# Patient Record
Sex: FEMALE | Race: WHITE | Employment: UNEMPLOYED | URBAN - METROPOLITAN AREA
[De-identification: names, ages, dates, MRNs, and addresses within clinical notes are randomized per-mention and may not be internally consistent; named-entity substitution may affect disease eponyms.]

---

## 2020-07-20 ENCOUNTER — APPOINTMENT (OUTPATIENT)
Dept: CT IMAGING | Age: 50
DRG: 177 | End: 2020-07-20
Attending: EMERGENCY MEDICINE
Payer: OTHER GOVERNMENT

## 2020-07-20 ENCOUNTER — HOSPITAL ENCOUNTER (INPATIENT)
Age: 50
LOS: 3 days | Discharge: HOME | DRG: 177 | End: 2020-07-23
Attending: EMERGENCY MEDICINE | Admitting: INTERNAL MEDICINE
Payer: OTHER GOVERNMENT

## 2020-07-20 ENCOUNTER — APPOINTMENT (OUTPATIENT)
Dept: GENERAL RADIOLOGY | Age: 50
DRG: 177 | End: 2020-07-20
Attending: EMERGENCY MEDICINE
Payer: OTHER GOVERNMENT

## 2020-07-20 DIAGNOSIS — J12.82 PNEUMONIA DUE TO COVID-19 VIRUS: ICD-10-CM

## 2020-07-20 DIAGNOSIS — R09.02 HYPOXIA: ICD-10-CM

## 2020-07-20 DIAGNOSIS — U07.1 PNEUMONIA DUE TO COVID-19 VIRUS: ICD-10-CM

## 2020-07-20 DIAGNOSIS — R06.02 SOB (SHORTNESS OF BREATH): ICD-10-CM

## 2020-07-20 DIAGNOSIS — U07.1 COVID-19: Primary | ICD-10-CM

## 2020-07-20 PROBLEM — E66.01 MORBID OBESITY WITH BODY MASS INDEX OF 40.0-44.9 IN ADULT (HCC): Status: ACTIVE | Noted: 2020-01-15

## 2020-07-20 PROBLEM — R03.0 BLOOD PRESSURE ELEVATED WITHOUT HISTORY OF HTN: Status: ACTIVE | Noted: 2020-01-15

## 2020-07-20 LAB
ALBUMIN SERPL-MCNC: 3.4 G/DL (ref 3.4–5)
ALBUMIN/GLOB SERPL: 0.9 {RATIO} (ref 0.8–1.7)
ALP SERPL-CCNC: 116 U/L (ref 45–117)
ALT SERPL-CCNC: 60 U/L (ref 13–56)
ANION GAP SERPL CALC-SCNC: 9 MMOL/L (ref 3–18)
APPEARANCE UR: CLEAR
ARTERIAL PATENCY WRIST A: ABNORMAL
AST SERPL-CCNC: 36 U/L (ref 10–38)
BACTERIA URNS QL MICRO: ABNORMAL /HPF
BASE EXCESS BLD CALC-SCNC: 1 MMOL/L
BASOPHILS # BLD: 0 K/UL (ref 0–0.1)
BASOPHILS NFR BLD: 0 % (ref 0–2)
BDY SITE: ABNORMAL
BILIRUB SERPL-MCNC: 0.4 MG/DL (ref 0.2–1)
BILIRUB UR QL: NEGATIVE
BUN SERPL-MCNC: 13 MG/DL (ref 7–18)
BUN/CREAT SERPL: 13 (ref 12–20)
CALCIUM SERPL-MCNC: 9.6 MG/DL (ref 8.5–10.1)
CHLORIDE SERPL-SCNC: 107 MMOL/L (ref 100–111)
CO2 SERPL-SCNC: 26 MMOL/L (ref 21–32)
COLOR UR: YELLOW
CREAT SERPL-MCNC: 0.98 MG/DL (ref 0.6–1.3)
DIFFERENTIAL METHOD BLD: ABNORMAL
EOSINOPHIL # BLD: 0.1 K/UL (ref 0–0.4)
EOSINOPHIL NFR BLD: 1 % (ref 0–5)
EPITH CASTS URNS QL MICRO: ABNORMAL /LPF (ref 0–5)
ERYTHROCYTE [DISTWIDTH] IN BLOOD BY AUTOMATED COUNT: 12.4 % (ref 11.6–14.5)
GAS FLOW.O2 O2 DELIVERY SYS: ABNORMAL L/MIN
GLOBULIN SER CALC-MCNC: 3.8 G/DL (ref 2–4)
GLUCOSE SERPL-MCNC: 129 MG/DL (ref 74–99)
GLUCOSE UR STRIP.AUTO-MCNC: NEGATIVE MG/DL
HCG UR QL: NEGATIVE
HCO3 BLD-SCNC: 25.6 MMOL/L (ref 22–26)
HCT VFR BLD AUTO: 48.6 % (ref 35–45)
HGB BLD-MCNC: 15.8 G/DL (ref 12–16)
HGB UR QL STRIP: ABNORMAL
KETONES UR QL STRIP.AUTO: NEGATIVE MG/DL
LEUKOCYTE ESTERASE UR QL STRIP.AUTO: NEGATIVE
LIPASE SERPL-CCNC: 280 U/L (ref 73–393)
LYMPHOCYTES # BLD: 1.8 K/UL (ref 0.9–3.6)
LYMPHOCYTES NFR BLD: 26 % (ref 21–52)
MCH RBC QN AUTO: 30.9 PG (ref 24–34)
MCHC RBC AUTO-ENTMCNC: 32.5 G/DL (ref 31–37)
MCV RBC AUTO: 94.9 FL (ref 74–97)
MONOCYTES # BLD: 0.4 K/UL (ref 0.05–1.2)
MONOCYTES NFR BLD: 6 % (ref 3–10)
MUCOUS THREADS URNS QL MICRO: ABNORMAL /LPF
NEUTS SEG # BLD: 4.5 K/UL (ref 1.8–8)
NEUTS SEG NFR BLD: 67 % (ref 40–73)
NITRITE UR QL STRIP.AUTO: NEGATIVE
O2/TOTAL GAS SETTING VFR VENT: 0.21 %
PCO2 BLD: 41.8 MMHG (ref 35–45)
PH BLD: 7.4 [PH] (ref 7.35–7.45)
PH UR STRIP: 5.5 [PH] (ref 5–8)
PLATELET # BLD AUTO: 416 K/UL (ref 135–420)
PMV BLD AUTO: 9.6 FL (ref 9.2–11.8)
PO2 BLD: 67 MMHG (ref 80–100)
POTASSIUM SERPL-SCNC: 4.3 MMOL/L (ref 3.5–5.5)
PROT SERPL-MCNC: 7.2 G/DL (ref 6.4–8.2)
PROT UR STRIP-MCNC: ABNORMAL MG/DL
RBC # BLD AUTO: 5.12 M/UL (ref 4.2–5.3)
RBC #/AREA URNS HPF: ABNORMAL /HPF (ref 0–5)
SAO2 % BLD: 93 % (ref 92–97)
SERVICE CMNT-IMP: ABNORMAL
SODIUM SERPL-SCNC: 142 MMOL/L (ref 136–145)
SP GR UR REFRACTOMETRY: >1.03 (ref 1–1.03)
SPECIMEN TYPE: ABNORMAL
TOTAL RESP. RATE, ITRR: 18
UROBILINOGEN UR QL STRIP.AUTO: 0.2 EU/DL (ref 0.2–1)
WBC # BLD AUTO: 6.7 K/UL (ref 4.6–13.2)
WBC URNS QL MICRO: ABNORMAL /HPF (ref 0–5)

## 2020-07-20 PROCEDURE — 83690 ASSAY OF LIPASE: CPT

## 2020-07-20 PROCEDURE — 85025 COMPLETE CBC W/AUTO DIFF WBC: CPT

## 2020-07-20 PROCEDURE — 74011250636 HC RX REV CODE- 250/636: Performed by: INTERNAL MEDICINE

## 2020-07-20 PROCEDURE — 87450 LEGIONELLA PNEUMOPHILA AG, URINE: CPT

## 2020-07-20 PROCEDURE — 99285 EMERGENCY DEPT VISIT HI MDM: CPT

## 2020-07-20 PROCEDURE — 74011250637 HC RX REV CODE- 250/637: Performed by: INTERNAL MEDICINE

## 2020-07-20 PROCEDURE — 81001 URINALYSIS AUTO W/SCOPE: CPT

## 2020-07-20 PROCEDURE — 71260 CT THORAX DX C+: CPT

## 2020-07-20 PROCEDURE — 74011250636 HC RX REV CODE- 250/636: Performed by: EMERGENCY MEDICINE

## 2020-07-20 PROCEDURE — 81025 URINE PREGNANCY TEST: CPT

## 2020-07-20 PROCEDURE — 71045 X-RAY EXAM CHEST 1 VIEW: CPT

## 2020-07-20 PROCEDURE — 96374 THER/PROPH/DIAG INJ IV PUSH: CPT

## 2020-07-20 PROCEDURE — 74011636320 HC RX REV CODE- 636/320: Performed by: EMERGENCY MEDICINE

## 2020-07-20 PROCEDURE — 65660000000 HC RM CCU STEPDOWN

## 2020-07-20 PROCEDURE — 36600 WITHDRAWAL OF ARTERIAL BLOOD: CPT

## 2020-07-20 PROCEDURE — 87449 NOS EACH ORGANISM AG IA: CPT

## 2020-07-20 PROCEDURE — 80053 COMPREHEN METABOLIC PANEL: CPT

## 2020-07-20 PROCEDURE — 82803 BLOOD GASES ANY COMBINATION: CPT

## 2020-07-20 RX ORDER — SODIUM CHLORIDE 9 MG/ML
75 INJECTION, SOLUTION INTRAVENOUS CONTINUOUS
Status: DISPENSED | OUTPATIENT
Start: 2020-07-20 | End: 2020-07-21

## 2020-07-20 RX ORDER — MELATONIN
2000 DAILY
Status: DISCONTINUED | OUTPATIENT
Start: 2020-07-21 | End: 2020-07-23 | Stop reason: HOSPADM

## 2020-07-20 RX ORDER — ALBUTEROL SULFATE 90 UG/1
2 AEROSOL, METERED RESPIRATORY (INHALATION)
Status: DISCONTINUED | OUTPATIENT
Start: 2020-07-21 | End: 2020-07-23 | Stop reason: HOSPADM

## 2020-07-20 RX ORDER — ALBUTEROL SULFATE 0.83 MG/ML
2.5 SOLUTION RESPIRATORY (INHALATION)
Status: DISCONTINUED | OUTPATIENT
Start: 2020-07-21 | End: 2020-07-20 | Stop reason: CLARIF

## 2020-07-20 RX ORDER — ONDANSETRON 2 MG/ML
4 INJECTION INTRAMUSCULAR; INTRAVENOUS ONCE
Status: COMPLETED | OUTPATIENT
Start: 2020-07-20 | End: 2020-07-20

## 2020-07-20 RX ORDER — LEVOFLOXACIN 5 MG/ML
750 INJECTION, SOLUTION INTRAVENOUS EVERY 24 HOURS
Status: DISCONTINUED | OUTPATIENT
Start: 2020-07-20 | End: 2020-07-21

## 2020-07-20 RX ORDER — ZINC SULFATE 50(220)MG
1 CAPSULE ORAL DAILY
Status: DISCONTINUED | OUTPATIENT
Start: 2020-07-21 | End: 2020-07-23 | Stop reason: HOSPADM

## 2020-07-20 RX ORDER — ASCORBIC ACID 250 MG
250 TABLET ORAL DAILY
Status: DISCONTINUED | OUTPATIENT
Start: 2020-07-21 | End: 2020-07-21

## 2020-07-20 RX ORDER — ALBUTEROL SULFATE 90 UG/1
2 AEROSOL, METERED RESPIRATORY (INHALATION)
Status: DISCONTINUED | OUTPATIENT
Start: 2020-07-21 | End: 2020-07-20 | Stop reason: CLARIF

## 2020-07-20 RX ORDER — DEXAMETHASONE 4 MG/1
6 TABLET ORAL DAILY
Status: DISCONTINUED | OUTPATIENT
Start: 2020-07-20 | End: 2020-07-22

## 2020-07-20 RX ORDER — CHOLECALCIFEROL (VITAMIN D3) 125 MCG
5 CAPSULE ORAL
Status: DISCONTINUED | OUTPATIENT
Start: 2020-07-20 | End: 2020-07-23 | Stop reason: HOSPADM

## 2020-07-20 RX ORDER — ACETAMINOPHEN 650 MG/1
650 SUPPOSITORY RECTAL
Status: DISCONTINUED | OUTPATIENT
Start: 2020-07-20 | End: 2020-07-23 | Stop reason: HOSPADM

## 2020-07-20 RX ORDER — HYDROCODONE POLISTIREX AND CHLORPHENIRAMINE POLISTIREX 10; 8 MG/5ML; MG/5ML
5 SUSPENSION, EXTENDED RELEASE ORAL
Status: DISCONTINUED | OUTPATIENT
Start: 2020-07-20 | End: 2020-07-23 | Stop reason: HOSPADM

## 2020-07-20 RX ORDER — ENOXAPARIN SODIUM 100 MG/ML
30 INJECTION SUBCUTANEOUS EVERY 12 HOURS
Status: DISCONTINUED | OUTPATIENT
Start: 2020-07-20 | End: 2020-07-21

## 2020-07-20 RX ORDER — ACETAMINOPHEN 325 MG/1
650 TABLET ORAL
Status: DISCONTINUED | OUTPATIENT
Start: 2020-07-20 | End: 2020-07-23 | Stop reason: HOSPADM

## 2020-07-20 RX ADMIN — LEVOFLOXACIN 750 MG: 5 INJECTION, SOLUTION INTRAVENOUS at 22:52

## 2020-07-20 RX ADMIN — ENOXAPARIN SODIUM 30 MG: 30 INJECTION SUBCUTANEOUS at 20:56

## 2020-07-20 RX ADMIN — SODIUM CHLORIDE 75 ML/HR: 900 INJECTION, SOLUTION INTRAVENOUS at 22:48

## 2020-07-20 RX ADMIN — DEXAMETHASONE 6 MG: 4 TABLET ORAL at 22:53

## 2020-07-20 RX ADMIN — ONDANSETRON 4 MG: 2 INJECTION INTRAMUSCULAR; INTRAVENOUS at 14:07

## 2020-07-20 RX ADMIN — IOPAMIDOL 100 ML: 612 INJECTION, SOLUTION INTRAVENOUS at 17:38

## 2020-07-20 RX ADMIN — Medication 5 MG: at 22:49

## 2020-07-20 RX ADMIN — SODIUM CHLORIDE 1000 ML: 900 INJECTION, SOLUTION INTRAVENOUS at 13:46

## 2020-07-20 NOTE — ED TRIAGE NOTES
Pt w/ c/o worsening of SOB and cough x4 days after being diagnosed with COVID on Tuesday. Pt reports a majority of her household has tested positive for COVID. Pt reports having cough for 2 weeks and intermittent cp w/ cough and inspiration. Pt reports intermittent fevers last week and no relief w/ rescue inhaler.

## 2020-07-20 NOTE — H&P
History & Physical    Patient: Elizabet Monsivais MRN: [id number]  CSN: [id number]    YOB: 1970  Age: 52 y.o. Sex: female      DOA: 7/20/2020    Chief Complaint:   Chief Complaint   Patient presents with    Shortness of Breath          HPI:     Elizabet Monsivais is a 52 y.o.  female who has history of hypertension presents and obesity presents to the emergency room with worsening hypoxemia and dyspnea despite using her inhaler she was seen at urgent care on the 14th and tested positive for COVID she was at a family gathering for 4 July several family members were ill after including her  and child she unfortunately has progressed with cough congestion body aches and dyspnea in the emergency room she was found to have sats of 90 to 92% on room air and mostly would desat with exertion and ABGs not done a CT scan in the emergency room shows bilateral atypical viral pneumonia consistent with COVID    History reviewed. No pertinent past medical history. History reviewed. No pertinent surgical history. History reviewed. No pertinent family history. Social History     Socioeconomic History    Marital status:      Spouse name: Not on file    Number of children: Not on file    Years of education: Not on file    Highest education level: Not on file   Tobacco Use    Smoking status: Never Smoker    Smokeless tobacco: Never Used   Substance and Sexual Activity    Alcohol use: Not Currently    Drug use: Never       Prior to Admission medications    Not on File       Allergies   Allergen Reactions    Penicillins Anaphylaxis         Review of Systems  GENERAL: Patient alert, awake and oriented times 3, able to communicate full sentences and not in distress. HEENT: No change in vision, no earache, tinnitus, sore throat or sinus congestion. NECK: No pain or stiffness. PULMONARY: +shortness of breath, +cough +wheeze.    Cardiovascular: no pnd or orthopnea, no CP  GASTROINTESTINAL: No abdominal pain, nausea, vomiting or diarrhea, melena or bright red blood per rectum. GENITOURINARY: No urinary frequency, urgency, hesitancy or dysuria. MUSCULOSKELETAL: + joint or muscle pain, no back pain, no recent trauma. DERMATOLOGIC: No rash, no itching, no lesions. ENDOCRINE: No polyuria, polydipsia, no heat or cold intolerance. No recent change in weight. HEMATOLOGICAL: No anemia or easy bruising or bleeding. NEUROLOGIC: + headache, seizures, numbness, tingling +weakness. Physical Exam:     Physical Exam:  Visit Vitals  /83   Pulse 84   Temp 98.2 °F (36.8 °C)   Resp 18   Ht 5' 8\" (1.727 m)   Wt 127 kg (280 lb)   SpO2 95%   BMI 42.57 kg/m²      O2 Device: Room air    Temp (24hrs), Av.6 °F (36.4 °C), Min:97 °F (36.1 °C), Max:98.2 °F (36.8 °C)    No intake/output data recorded.  0701 -  1900  In: 1000 [I.V.:1000]  Out: -     General:  Alert, cooperative, no distress, appears stated age. Head: Normocephalic, without obvious abnormality, atraumatic. Eyes:  Conjunctivae/corneas clear. PERRL, EOMs intact. Tongue piercing   Nose: Nares normal. No drainage or sinus tenderness. Neck: Supple, symmetrical, trachea midline, no adenopathy, thyroid: no enlargement, no carotid bruit and no JVD. Lungs:   Clear to auscultation bilaterally. Diffuse expiratory wheeze   Heart:  Regular rate and rhythm, S1, S2 normal.     Abdomen: Soft, non-tender. Bowel sounds normal.    Extremities: Extremities normal, atraumatic, no cyanosis or edema. Pulses: 2+ and symmetric all extremities. Skin:  No rashes or lesions   Neurologic: AAOx3, No focal motor or sensory deficit.        Labs Reviewed:  Recent Results (from the past 24 hour(s))   CBC WITH AUTOMATED DIFF    Collection Time: 20  1:40 PM   Result Value Ref Range    WBC 6.7 4.6 - 13.2 K/uL    RBC 5.12 4.20 - 5.30 M/uL    HGB 15.8 12.0 - 16.0 g/dL    HCT 48.6 (H) 35.0 - 45.0 %    MCV 94.9 74.0 - 97.0 FL    MCH 30.9 24.0 - 34.0 PG    MCHC 32.5 31.0 - 37.0 g/dL    RDW 12.4 11.6 - 14.5 %    PLATELET 956 307 - 589 K/uL    MPV 9.6 9.2 - 11.8 FL    NEUTROPHILS 67 40 - 73 %    LYMPHOCYTES 26 21 - 52 %    MONOCYTES 6 3 - 10 %    EOSINOPHILS 1 0 - 5 %    BASOPHILS 0 0 - 2 %    ABS. NEUTROPHILS 4.5 1.8 - 8.0 K/UL    ABS. LYMPHOCYTES 1.8 0.9 - 3.6 K/UL    ABS. MONOCYTES 0.4 0.05 - 1.2 K/UL    ABS. EOSINOPHILS 0.1 0.0 - 0.4 K/UL    ABS. BASOPHILS 0.0 0.0 - 0.1 K/UL    DF AUTOMATED     METABOLIC PANEL, COMPREHENSIVE    Collection Time: 07/20/20  1:40 PM   Result Value Ref Range    Sodium 142 136 - 145 mmol/L    Potassium 4.3 3.5 - 5.5 mmol/L    Chloride 107 100 - 111 mmol/L    CO2 26 21 - 32 mmol/L    Anion gap 9 3.0 - 18 mmol/L    Glucose 129 (H) 74 - 99 mg/dL    BUN 13 7.0 - 18 MG/DL    Creatinine 0.98 0.6 - 1.3 MG/DL    BUN/Creatinine ratio 13 12 - 20      GFR est AA >60 >60 ml/min/1.73m2    GFR est non-AA >60 >60 ml/min/1.73m2    Calcium 9.6 8.5 - 10.1 MG/DL    Bilirubin, total 0.4 0.2 - 1.0 MG/DL    ALT (SGPT) 60 (H) 13 - 56 U/L    AST (SGOT) 36 10 - 38 U/L    Alk.  phosphatase 116 45 - 117 U/L    Protein, total 7.2 6.4 - 8.2 g/dL    Albumin 3.4 3.4 - 5.0 g/dL    Globulin 3.8 2.0 - 4.0 g/dL    A-G Ratio 0.9 0.8 - 1.7     LIPASE    Collection Time: 07/20/20  1:40 PM   Result Value Ref Range    Lipase 280 73 - 393 U/L   URINALYSIS W/ RFLX MICROSCOPIC    Collection Time: 07/20/20  2:20 PM   Result Value Ref Range    Color YELLOW      Appearance CLEAR      Specific gravity >1.030 (H) 1.005 - 1.030    pH (UA) 5.5 5.0 - 8.0      Protein TRACE (A) NEG mg/dL    Glucose Negative NEG mg/dL    Ketone Negative NEG mg/dL    Bilirubin Negative NEG      Blood SMALL (A) NEG      Urobilinogen 0.2 0.2 - 1.0 EU/dL    Nitrites Negative NEG      Leukocyte Esterase Negative NEG     HCG URINE, QL    Collection Time: 07/20/20  2:20 PM   Result Value Ref Range    HCG urine, QL Negative NEG     URINE MICROSCOPIC ONLY    Collection Time: 07/20/20  2:20 PM   Result Value Ref Range    WBC 2 to 5 0 - 5 /hpf    RBC 2 to 5 0 - 5 /hpf    Epithelial cells 3+ 0 - 5 /lpf    Bacteria 3+ (A) NEG /hpf    Mucus 4+ (A) NEG /lpf     All lab results for the last 24 hours reviewed. and EKG    Procedures/imaging: see electronic medical records for all procedures/Xrays and details which were not copied into this note but were reviewed prior to creation of Plan      Assessment/Plan     Active Problems:    * No active hospital problems. *  1.  COVID pneumonia we will treat with supportive care she will be placed on low-dose Decadron due to her mild hypoxemia I will cover for bacterial infection with Levaquin due to severe allergy of penicillin we will also check urine streptococcal antigen and Legionella inflammatory markers are obtained she is placed on prophylactic zinc melatonin vitamin D and vitamin C  We will get ID consult do not believe she is sick enough right now for Remdesivir or plasma that is convalescent  She is placed on therapeutic Lovenox duplex ultrasound will be obtained  2. Hypoxemia supplemental oxygen as needed we will obtain a baseline ABG  DVT/GI Prophylaxis: Lovenox  Echocardiogram obtain cardiac enzymes obtained  Proventil. Discussed with patient at bedside about hospital admission and my plan care, who understood and agree with my plan care.     Christina White MD  7/20/2020 7:21 PM

## 2020-07-20 NOTE — ED PROVIDER NOTES
EMERGENCY DEPARTMENT HISTORY AND PHYSICAL EXAM    Date: 7/20/2020  Patient Name: Bry Rubio    History of Presenting Illness     Chief Complaint   Patient presents with    Shortness of Breath       History Provided By: Patient     History Odalys Han):   1:21 PM  Bry Rubio is a 52 y.o. female with no significant PMHX who presents to the emergency department [address] onset Fourth of July weekend. Associated sxs include nausea and abdominal pain. Pt denies or any other sxs or complaints. Patient was informed on Saturday that she is positive for COVID. She was exposed over 4 July weekend with the rest of her family. She has cough that is disturbing her with mild shortness of breath. She also has some nausea and abdominal pain. She was placed on a antibiotic by her primary care doctor. Chief Complaint: COVID  Duration: 2 weeks  Timing: Acute  Location: Generalized  Quality: Tightness  Severity: Moderate  Modifying Factors: Nothing makes it better, the ED or worse.   Associated Symptoms: Nausea and abdominal pain    PCP: Reynaldo Morgan NP     Current Facility-Administered Medications   Medication Dose Route Frequency Provider Last Rate Last Dose    enoxaparin (LOVENOX) injection 30 mg  30 mg SubCUTAneous Q12H Dustin Byrne MD   30 mg at 07/20/20 2056    acetaminophen (TYLENOL) tablet 650 mg  650 mg Oral Q6H PRN Dustin Byrne MD        Or    acetaminophen (TYLENOL) suppository 650 mg  650 mg Rectal Q6H PRN Dustin Byrne MD        dexAMETHasone (DECADRON) tablet 6 mg  6 mg Oral DAILY Dustin Byrne MD   6 mg at 07/20/20 2253    zinc sulfate (ZINCATE) 220 (50) mg capsule 1 Cap  1 Cap Oral DAILY Dustin Byrne MD        melatonin tablet 5 mg  5 mg Oral QHS Dustin Bynre MD   5 mg at 07/20/20 2249    cholecalciferol (VITAMIN D3) (1000 Units /25 mcg) tablet 2 Tab  2,000 Units Oral DAILY Dustin Byrne MD        ascorbic acid (vitamin C) (VITAMIN C) tablet 250 mg  250 mg Oral DAILY Soo Byrne MD        levoFLOXacin (LEVAQUIN) 750 mg in D5W IVPB  750 mg IntraVENous Q24H Soo Byrne  mL/hr at 07/20/20 2252 750 mg at 07/20/20 2252    chlorpheniramine-HYDROcodone (TUSSIONEX) oral suspension 5 mL  5 mL Oral Q12H PRN Soo Byrne MD        0.9% sodium chloride infusion  75 mL/hr IntraVENous CONTINUOUS Soo Byrne MD 75 mL/hr at 07/20/20 2248 75 mL/hr at 07/20/20 2248    albuterol (PROVENTIL HFA, VENTOLIN HFA, PROAIR HFA) inhaler (Keep In Patient Room)  2 Puff Inhalation Q4H RT Soo Byrne MD           Past History     Past Medical History:  Past Medical History:   Diagnosis Date    HTN (hypertension)     Hyperglycemia     Obesity        Past Surgical History:  History reviewed. No pertinent surgical history. Family History:  Family History   Problem Relation Age of Onset    Heart Disease Mother        Social History:  Social History     Tobacco Use    Smoking status: Never Smoker    Smokeless tobacco: Never Used   Substance Use Topics    Alcohol use: Not Currently    Drug use: Never       Allergies: Allergies   Allergen Reactions    Penicillins Anaphylaxis         Review of Systems   Review of Systems   Constitutional: Negative for chills and fever. Respiratory: Positive for cough and shortness of breath. Cardiovascular: Negative for chest pain. Gastrointestinal: Positive for abdominal pain and nausea. Negative for vomiting. All other systems reviewed and are negative. Physical Exam     Vitals:    07/20/20 1945 07/20/20 2030 07/20/20 2100 07/20/20 2230   BP: 134/73 125/82 129/83 126/80   Pulse:   84 81   Resp:   18 18   Temp:   98.2 °F (36.8 °C) 98.7 °F (37.1 °C)   SpO2: 95% 93% 95% 97%   Weight:       Height:         Physical Exam  Vitals signs and nursing note reviewed.        Vital signs and nursing notes reviewed  CONSTITUTIONAL: Alert, in no apparent distress; well-developed; well-nourished. HEAD:  Normocephalic, atraumatic  EYES: PERRL; EOM's intact. ENTM: Nose: no rhinorrhea; Throat: no erythema or exudate, mucous membranes moist  Neck:  No JVD, supple without lymphadenopathy  RESP: Coarse breath sounds bilaterally, good air movement. CV: S1 and S2 WNL; No murmurs, gallops or rubs. GI: Normal bowel sounds, abdomen soft and non-tender with no rebound or guarding. No masses or organomegaly. : No costo-vertebral angle tenderness. BACK:  Non-tender  UPPER EXT:  Normal inspection  LOWER EXT: No edema, no calf tenderness. Distal pulses intact. NEURO: CN intact, reflexes 2/4 and sym, strength 5/5 and sym, sensation intact. SKIN: No rashes; Normal for age and stage. PSYCH:  Alert and oriented, normal affect. Diagnostic Study Results     Labs -     Recent Results (from the past 12 hour(s))   CBC WITH AUTOMATED DIFF    Collection Time: 07/20/20  1:40 PM   Result Value Ref Range    WBC 6.7 4.6 - 13.2 K/uL    RBC 5.12 4.20 - 5.30 M/uL    HGB 15.8 12.0 - 16.0 g/dL    HCT 48.6 (H) 35.0 - 45.0 %    MCV 94.9 74.0 - 97.0 FL    MCH 30.9 24.0 - 34.0 PG    MCHC 32.5 31.0 - 37.0 g/dL    RDW 12.4 11.6 - 14.5 %    PLATELET 533 494 - 696 K/uL    MPV 9.6 9.2 - 11.8 FL    NEUTROPHILS 67 40 - 73 %    LYMPHOCYTES 26 21 - 52 %    MONOCYTES 6 3 - 10 %    EOSINOPHILS 1 0 - 5 %    BASOPHILS 0 0 - 2 %    ABS. NEUTROPHILS 4.5 1.8 - 8.0 K/UL    ABS. LYMPHOCYTES 1.8 0.9 - 3.6 K/UL    ABS. MONOCYTES 0.4 0.05 - 1.2 K/UL    ABS. EOSINOPHILS 0.1 0.0 - 0.4 K/UL    ABS.  BASOPHILS 0.0 0.0 - 0.1 K/UL    DF AUTOMATED     METABOLIC PANEL, COMPREHENSIVE    Collection Time: 07/20/20  1:40 PM   Result Value Ref Range    Sodium 142 136 - 145 mmol/L    Potassium 4.3 3.5 - 5.5 mmol/L    Chloride 107 100 - 111 mmol/L    CO2 26 21 - 32 mmol/L    Anion gap 9 3.0 - 18 mmol/L    Glucose 129 (H) 74 - 99 mg/dL    BUN 13 7.0 - 18 MG/DL    Creatinine 0.98 0.6 - 1.3 MG/DL    BUN/Creatinine ratio 13 12 - 20      GFR est AA >60 >60 ml/min/1.73m2    GFR est non-AA >60 >60 ml/min/1.73m2    Calcium 9.6 8.5 - 10.1 MG/DL    Bilirubin, total 0.4 0.2 - 1.0 MG/DL    ALT (SGPT) 60 (H) 13 - 56 U/L    AST (SGOT) 36 10 - 38 U/L    Alk. phosphatase 116 45 - 117 U/L    Protein, total 7.2 6.4 - 8.2 g/dL    Albumin 3.4 3.4 - 5.0 g/dL    Globulin 3.8 2.0 - 4.0 g/dL    A-G Ratio 0.9 0.8 - 1.7     LIPASE    Collection Time: 07/20/20  1:40 PM   Result Value Ref Range    Lipase 280 73 - 393 U/L   URINALYSIS W/ RFLX MICROSCOPIC    Collection Time: 07/20/20  2:20 PM   Result Value Ref Range    Color YELLOW      Appearance CLEAR      Specific gravity >1.030 (H) 1.005 - 1.030    pH (UA) 5.5 5.0 - 8.0      Protein TRACE (A) NEG mg/dL    Glucose Negative NEG mg/dL    Ketone Negative NEG mg/dL    Bilirubin Negative NEG      Blood SMALL (A) NEG      Urobilinogen 0.2 0.2 - 1.0 EU/dL    Nitrites Negative NEG      Leukocyte Esterase Negative NEG     HCG URINE, QL    Collection Time: 07/20/20  2:20 PM   Result Value Ref Range    HCG urine, QL Negative NEG     URINE MICROSCOPIC ONLY    Collection Time: 07/20/20  2:20 PM   Result Value Ref Range    WBC 2 to 5 0 - 5 /hpf    RBC 2 to 5 0 - 5 /hpf    Epithelial cells 3+ 0 - 5 /lpf    Bacteria 3+ (A) NEG /hpf    Mucus 4+ (A) NEG /lpf   POC G3    Collection Time: 07/20/20 11:31 PM   Result Value Ref Range    Device: ROOM AIR      FIO2 (POC) 0.21 %    pH (POC) 7.40 7.35 - 7.45      pCO2 (POC) 41.8 35.0 - 45.0 MMHG    pO2 (POC) 67 (L) 80 - 100 MMHG    HCO3 (POC) 25.6 22 - 26 MMOL/L    sO2 (POC) 93 92 - 97 %    Base excess (POC) 1 mmol/L    Allens test (POC) N/A      Total resp. rate 18      Site LEFT RADIAL      Specimen type (POC) ARTERIAL      Performed by Leonarda Ya        Radiologic Studies -   CT CHEST W CONT   Final Result   IMPRESSION:      There are peripheral groundglass opacities and airspace disease in both upper   lobes and left lower lobe as well as right middle lobe.  Findings are suggestive   of pneumonia and are typical in appearance for Covid 19 pneumonia. Hepatic steatosis      Splenomegaly      XR CHEST PORT   Final Result   IMPRESSION:      Bilateral lower lobe infiltrates, atelectasis/effusion. Subtle nodularity in the   right mid to upper lung zone. CT Results  (Last 48 hours)               07/20/20 1744  CT CHEST W CONT Final result    Impression:  IMPRESSION:       There are peripheral groundglass opacities and airspace disease in both upper   lobes and left lower lobe as well as right middle lobe. Findings are suggestive   of pneumonia and are typical in appearance for Covid 19 pneumonia. Hepatic steatosis       Splenomegaly       Narrative:  EXAM: CT chest        INDICATION: Cough, COVID       COMPARISON: Chest x-ray dated July 20, 2020       TECHNIQUE: Axial CT imaging from the thoracic inlet through the diaphragm with   intravenous contrast. Multiplanar reformats were generated. One or more dose   reduction techniques were used on this CT: automated exposure control,   adjustment of the mAs and/or kVp according to patient size, and iterative   reconstruction techniques. The specific techniques used on this CT exam have   been documented in the patient's electronic medical record. Digital Imaging and   Communications in Medicine (DICOM) format image data are available to   nonaffiliated external healthcare facilities or entities on a secure, media   free, reciprocally searchable basis with patient authorization for at least a   12-month period after this study. _______________       FINDINGS:       THYROID: Unremarkable       LYMPH NODES: No enlarged lymph nodes seen. PLEURA: There are no pleural effusion. HEART: Normal in size without pericardial effusion. VASCULATURE/MEDIASTINUM: Unremarkable       LUNGS: No suspicious nodule or mass. There are patchy airspace opacities in both   upper lobes and left lower lobe with associated groundglass densities. These   findings are typical for COVID 19 pneumonia       AIRWAY: Patent       UPPER ABDOMEN: There is hepatic steatosis with areas of focal fatty sparing. Spleen is enlarged at 13 cm. Gallbladder is contracted. Visualized solid organs   otherwise are unremarkable otherwise. OTHER: No acute or aggressive osseous abnormalities identified. _______________               CXR Results  (Last 48 hours)               07/20/20 1424  XR CHEST PORT Final result    Impression:  IMPRESSION:       Bilateral lower lobe infiltrates, atelectasis/effusion. Subtle nodularity in the   right mid to upper lung zone. Narrative:  EXAM: XR CHEST PORT       CLINICAL INDICATION/HISTORY: cough, COVID positive   -Additional: None       COMPARISON: None       TECHNIQUE: Portable frontal view of the chest       _______________       FINDINGS:       SUPPORT DEVICES: None. HEART AND MEDIASTINUM: Cardiomediastinal silhouette within normal limits. LUNGS AND PLEURAL SPACES: Bilateral lower lobe infiltrates,   atelectasis/effusion. Subtle nodularity in the right mid to upper lung zone.  No   large effusion or pneumothorax.       _______________                 Medications given in the ED-  Medications   enoxaparin (LOVENOX) injection 30 mg (30 mg SubCUTAneous Given 7/20/20 2056)   acetaminophen (TYLENOL) tablet 650 mg (has no administration in time range)     Or   acetaminophen (TYLENOL) suppository 650 mg (has no administration in time range)   dexAMETHasone (DECADRON) tablet 6 mg (6 mg Oral Given 7/20/20 2253)   zinc sulfate (ZINCATE) 220 (50) mg capsule 1 Cap (has no administration in time range)   melatonin tablet 5 mg (5 mg Oral Given 7/20/20 2249)   cholecalciferol (VITAMIN D3) (1000 Units /25 mcg) tablet 2 Tab (has no administration in time range)   ascorbic acid (vitamin C) (VITAMIN C) tablet 250 mg (has no administration in time range)   levoFLOXacin (LEVAQUIN) 750 mg in D5W IVPB (750 mg IntraVENous New Bag 7/20/20 4062)   chlorpheniramine-HYDROcodone (TUSSIONEX) oral suspension 5 mL (has no administration in time range)   0.9% sodium chloride infusion (75 mL/hr IntraVENous New Bag 7/20/20 2248)   albuterol (PROVENTIL HFA, VENTOLIN HFA, PROAIR HFA) inhaler (Keep In Patient Room) (has no administration in time range)   sodium chloride 0.9 % bolus infusion 1,000 mL (0 mL IntraVENous IV Completed 7/20/20 1446)   ondansetron (ZOFRAN) injection 4 mg (4 mg IntraVENous Given 7/20/20 1407)   iopamidoL (ISOVUE 300) 61 % contrast injection 100 mL (100 mL IntraVENous Given 7/20/20 1738)         Medical Decision Making   I am the first provider for this patient. I reviewed the vital signs, available nursing notes, past medical history, past surgical history, family history and social history. Vital Signs-Reviewed the patient's vital signs. Pulse Oximetry Analysis - 97% on RA     Cardiac Monitor:  Rate: 81 bpm  Rhythm: NSR    Records Reviewed: Nursing Notes    Provider Notes (Medical Decision Making):   DDX: COVID    Procedures:  Procedures    ED Course:   1:21 PM Initial assessment performed. The patients presenting problems have been discussed, and they are in agreement with the care plan formulated and outlined with them. I have encouraged them to ask questions as they arise throughout their visit. 7:28 PM Dr. Emily Shannon for tele admission    Diagnosis and Disposition     Discussion:  52 y.o. female with cold exposure, escalated test bilateral pneumonia patient has desaturations to the low 90s in bed. Discussed with hospitalist requested a CT scan prior to admission. Discussed with evening hospitalist the patient has bilateral, pneumonia. They will admit patient to the hospital.    Critical Care Time:   I have spent 45 minutes of critical care time involved in lab review, consultations with specialist, family decision-making, and documentation.   During this entire length of time I was immediately available to the patient. Critical Care: The reason for providing this level of medical care for this critically ill patient was due a critical illness that impaired one or more vital organ systems such that there was a high probability of imminent or life threatening deterioration in the patients condition. This care involved high complexity decision making to assess, manipulate, and support vital system functions, to treat this degreee vital organ system failure and to prevent further life threatening deterioration of the patients condition. Critical Care Time: 45 minutes    Core Measures:  For Hospitalized Patients:    1. Hospitalization Decision Time:  The decision to hospitalize the patient was made by Rachele Duggan MD, MD at 3:00 PM on 7/20/2020    2. Aspirin: Aspirin was not given because the patient did not present with a stroke at the time of their Emergency Department evaluation    7:28 PM. Patient is being admitted to the hospital by Dr. Amber Santaigo. The results of their tests and reasons for their admission have been discussed with them and/or available family. They convey agreement and understanding for the need to be admitted and for their admission diagnosis. CONDITIONS ON ADMISSION:  Sepsis is not present at the time of admission. Deep Vein Thrombosis is not present at the time of admission. Thrombosis is not present at the time of admission. Urinary Tract Infection is not present at the time of admission. Pneumonia is not present at the time of admission. MRSA is not present at the time of admission. Wound infection is not present at the time of admission. Pressure Ulcer is not present at the time of admission. CLINICAL IMPRESSION:    1. COVID-19    2. Pneumonia due to COVID-19 virus    3. Hypoxia        Dragon Disclaimer     Please note that this dictation was completed with [employer], the [employer] voice recognition software.   Quite often unanticipated grammatical, syntax, homophones, and other interpretive errors are inadvertently transcribed by the computer software. Please disregard these errors. Please excuse any errors that have escaped final proofreading.     Uri Adamson MD

## 2020-07-21 ENCOUNTER — APPOINTMENT (OUTPATIENT)
Dept: VASCULAR SURGERY | Age: 50
DRG: 177 | End: 2020-07-21
Attending: INTERNAL MEDICINE
Payer: OTHER GOVERNMENT

## 2020-07-21 ENCOUNTER — APPOINTMENT (OUTPATIENT)
Dept: NON INVASIVE DIAGNOSTICS | Age: 50
DRG: 177 | End: 2020-07-21
Attending: INTERNAL MEDICINE
Payer: OTHER GOVERNMENT

## 2020-07-21 LAB
ABO + RH BLD: NORMAL
ALBUMIN SERPL-MCNC: 3.3 G/DL (ref 3.4–5)
ALBUMIN/GLOB SERPL: 0.9 {RATIO} (ref 0.8–1.7)
ALP SERPL-CCNC: 101 U/L (ref 45–117)
ALT SERPL-CCNC: 65 U/L (ref 13–56)
ANION GAP SERPL CALC-SCNC: 7 MMOL/L (ref 3–18)
APTT PPP: 28.7 SEC (ref 23–36.4)
AST SERPL-CCNC: 35 U/L (ref 10–38)
AV VELOCITY RATIO: 0.64
AV VTI RATIO: 0.7
BASOPHILS # BLD: 0 K/UL (ref 0–0.1)
BASOPHILS NFR BLD: 0 % (ref 0–2)
BILIRUB SERPL-MCNC: 0.4 MG/DL (ref 0.2–1)
BUN SERPL-MCNC: 11 MG/DL (ref 7–18)
BUN/CREAT SERPL: 13 (ref 12–20)
CALCIUM SERPL-MCNC: 9.1 MG/DL (ref 8.5–10.1)
CHLORIDE SERPL-SCNC: 107 MMOL/L (ref 100–111)
CO2 SERPL-SCNC: 25 MMOL/L (ref 21–32)
CREAT SERPL-MCNC: 0.87 MG/DL (ref 0.6–1.3)
D DIMER PPP FEU-MCNC: 1.34 UG/ML(FEU)
DIFFERENTIAL METHOD BLD: ABNORMAL
ECHO AO ASC DIAM: 3.67 CM
ECHO AO ROOT DIAM: 3.38 CM
ECHO AV AREA PEAK VELOCITY: 2 CM2
ECHO AV AREA VTI: 2.4 CM2
ECHO AV AREA/BSA PEAK VELOCITY: 0.8 CM2/M2
ECHO AV AREA/BSA VTI: 1 CM2/M2
ECHO AV MEAN GRADIENT: 3.7 MMHG
ECHO AV MEAN VELOCITY: 0.89 M/S
ECHO AV PEAK GRADIENT: 7.6 MMHG
ECHO AV PEAK VELOCITY: 137.71 CM/S
ECHO AV VTI: 26.74 CM
ECHO IVC PROX: 2.31 CM
ECHO IVC SNIFF: 2.31 CM
ECHO LA MAJOR AXIS: 4.02 CM
ECHO LA MINOR AXIS: 1.7 CM
ECHO LV E' LATERAL VELOCITY: 8 CM/S
ECHO LV E' SEPTAL VELOCITY: 8 CM/S
ECHO LV EDV A2C: 89 ML
ECHO LV EDV A4C: 76.6 ML
ECHO LV EDV BP: 85.4 ML (ref 56–104)
ECHO LV EDV INDEX A4C: 32.5 ML/M2
ECHO LV EDV INDEX BP: 36.2 ML/M2
ECHO LV EDV NDEX A2C: 37.7 ML/M2
ECHO LV EDV TEICHHOLZ: 0.57 ML
ECHO LV EJECTION FRACTION A2C: 54 %
ECHO LV EJECTION FRACTION A4C: 74 %
ECHO LV EJECTION FRACTION BIPLANE: 62.6 % (ref 55–100)
ECHO LV ESV A2C: 40.6 ML
ECHO LV ESV A4C: 20.3 ML
ECHO LV ESV BP: 32 ML (ref 19–49)
ECHO LV ESV INDEX A2C: 17.2 ML/M2
ECHO LV ESV INDEX A4C: 8.6 ML/M2
ECHO LV ESV INDEX BP: 13.6 ML/M2
ECHO LV ESV TEICHHOLZ: 0.28 ML
ECHO LV INTERNAL DIMENSION DIASTOLIC: 4.62 CM (ref 3.9–5.3)
ECHO LV INTERNAL DIMENSION SYSTOLIC: 3.43 CM
ECHO LV IVSD: 1.06 CM (ref 0.6–0.9)
ECHO LV MASS 2D: 187.1 G (ref 67–162)
ECHO LV MASS INDEX 2D: 79.4 G/M2 (ref 43–95)
ECHO LV POSTERIOR WALL DIASTOLIC: 1.18 CM (ref 0.6–0.9)
ECHO LVOT DIAM: 2.01 CM
ECHO LVOT PEAK GRADIENT: 3.1 MMHG
ECHO LVOT PEAK VELOCITY: 87.91 CM/S
ECHO LVOT SV: 62.8 ML
ECHO LVOT VTI: 19.81 CM
ECHO MV A VELOCITY: 70.4 CM/S
ECHO MV AREA PHT: 2 CM2
ECHO MV E DECELERATION TIME (DT): 388.1 MS
ECHO MV E VELOCITY: 54.94 CM/S
ECHO MV E/A RATIO: 0.78
ECHO MV E/E' LATERAL: 6.87
ECHO MV E/E' RATIO (AVERAGED): 6.87
ECHO MV E/E' SEPTAL: 6.87
ECHO MV PRESSURE HALF TIME (PHT): 112.5 MS
ECHO RA AREA 4C: 12.61 CM2
ECHO RV INTERNAL DIMENSION: 3.15 CM
ECHO TV REGURGITANT MAX VELOCITY: 142.5 CM/S
ECHO TV REGURGITANT PEAK GRADIENT: 8.1 MMHG
EOSINOPHIL # BLD: 0 K/UL (ref 0–0.4)
EOSINOPHIL NFR BLD: 0 % (ref 0–5)
ERYTHROCYTE [DISTWIDTH] IN BLOOD BY AUTOMATED COUNT: 12.5 % (ref 11.6–14.5)
FIBRINOGEN PPP-MCNC: 471 MG/DL (ref 210–451)
GLOBULIN SER CALC-MCNC: 3.5 G/DL (ref 2–4)
GLUCOSE SERPL-MCNC: 149 MG/DL (ref 74–99)
HCT VFR BLD AUTO: 45.1 % (ref 35–45)
HGB BLD-MCNC: 14.9 G/DL (ref 12–16)
INR PPP: 1 (ref 0.8–1.2)
L PNEUMO AG UR QL IA: NEGATIVE
LVFS 2D: 25.88 %
LVOT MG: 1.72 MMHG
LVOT MV: 0.62 CM/S
LVSV (MOD BI): 21.65 ML
LVSV (MOD SINGLE 4C): 22.84 ML
LVSV (MOD SINGLE): 19.61 ML
LVSV (TEICH): 20.29 ML
LYMPHOCYTES # BLD: 1.3 K/UL (ref 0.9–3.6)
LYMPHOCYTES NFR BLD: 18 % (ref 21–52)
MCH RBC QN AUTO: 31.3 PG (ref 24–34)
MCHC RBC AUTO-ENTMCNC: 33 G/DL (ref 31–37)
MCV RBC AUTO: 94.7 FL (ref 74–97)
MONOCYTES # BLD: 0.1 K/UL (ref 0.05–1.2)
MONOCYTES NFR BLD: 1 % (ref 3–10)
MV DEC SLOPE: 1.42
NEUTS SEG # BLD: 5.6 K/UL (ref 1.8–8)
NEUTS SEG NFR BLD: 81 % (ref 40–73)
PLATELET # BLD AUTO: 414 K/UL (ref 135–420)
PMV BLD AUTO: 9.4 FL (ref 9.2–11.8)
POTASSIUM SERPL-SCNC: 4.4 MMOL/L (ref 3.5–5.5)
PROT SERPL-MCNC: 6.8 G/DL (ref 6.4–8.2)
PROTHROMBIN TIME: 12.9 SEC (ref 11.5–15.2)
RBC # BLD AUTO: 4.76 M/UL (ref 4.2–5.3)
S PNEUM AG UR QL: NEGATIVE
SODIUM SERPL-SCNC: 139 MMOL/L (ref 136–145)
WBC # BLD AUTO: 7 K/UL (ref 4.6–13.2)

## 2020-07-21 PROCEDURE — 85610 PROTHROMBIN TIME: CPT

## 2020-07-21 PROCEDURE — 80053 COMPREHEN METABOLIC PANEL: CPT

## 2020-07-21 PROCEDURE — 85730 THROMBOPLASTIN TIME PARTIAL: CPT

## 2020-07-21 PROCEDURE — 65660000000 HC RM CCU STEPDOWN

## 2020-07-21 PROCEDURE — 74011250636 HC RX REV CODE- 250/636: Performed by: HOSPITALIST

## 2020-07-21 PROCEDURE — 85025 COMPLETE CBC W/AUTO DIFF WBC: CPT

## 2020-07-21 PROCEDURE — 74011250637 HC RX REV CODE- 250/637: Performed by: HOSPITALIST

## 2020-07-21 PROCEDURE — 86900 BLOOD TYPING SEROLOGIC ABO: CPT

## 2020-07-21 PROCEDURE — 74011250637 HC RX REV CODE- 250/637: Performed by: INTERNAL MEDICINE

## 2020-07-21 PROCEDURE — 85384 FIBRINOGEN ACTIVITY: CPT

## 2020-07-21 PROCEDURE — C8929 TTE W OR WO FOL WCON,DOPPLER: HCPCS

## 2020-07-21 PROCEDURE — 93970 EXTREMITY STUDY: CPT

## 2020-07-21 PROCEDURE — 36415 COLL VENOUS BLD VENIPUNCTURE: CPT

## 2020-07-21 PROCEDURE — 74011250636 HC RX REV CODE- 250/636: Performed by: INTERNAL MEDICINE

## 2020-07-21 PROCEDURE — 77030027138 HC INCENT SPIROMETER -A

## 2020-07-21 PROCEDURE — 85379 FIBRIN DEGRADATION QUANT: CPT

## 2020-07-21 RX ORDER — ENOXAPARIN SODIUM 100 MG/ML
40 INJECTION SUBCUTANEOUS EVERY 12 HOURS
Status: DISCONTINUED | OUTPATIENT
Start: 2020-07-21 | End: 2020-07-23 | Stop reason: HOSPADM

## 2020-07-21 RX ORDER — ASCORBIC ACID 250 MG
500 TABLET ORAL 2 TIMES DAILY
Status: DISCONTINUED | OUTPATIENT
Start: 2020-07-21 | End: 2020-07-23 | Stop reason: HOSPADM

## 2020-07-21 RX ORDER — LEVOFLOXACIN 5 MG/ML
750 INJECTION, SOLUTION INTRAVENOUS ONCE
Status: DISCONTINUED | OUTPATIENT
Start: 2020-07-21 | End: 2020-07-21

## 2020-07-21 RX ADMIN — Medication 500 MG: at 09:22

## 2020-07-21 RX ADMIN — PERFLUTREN 1 ML: 6.52 INJECTION, SUSPENSION INTRAVENOUS at 09:07

## 2020-07-21 RX ADMIN — ENOXAPARIN SODIUM 30 MG: 30 INJECTION SUBCUTANEOUS at 09:24

## 2020-07-21 RX ADMIN — DEXAMETHASONE 6 MG: 4 TABLET ORAL at 09:22

## 2020-07-21 RX ADMIN — Medication 500 MG: at 21:15

## 2020-07-21 RX ADMIN — ALBUTEROL SULFATE 2 PUFF: 90 AEROSOL, METERED RESPIRATORY (INHALATION) at 15:30

## 2020-07-21 RX ADMIN — ALBUTEROL SULFATE 2 PUFF: 90 AEROSOL, METERED RESPIRATORY (INHALATION) at 08:00

## 2020-07-21 RX ADMIN — ZINC SULFATE 220 MG (50 MG) CAPSULE 1 CAPSULE: CAPSULE at 09:21

## 2020-07-21 RX ADMIN — ENOXAPARIN SODIUM 40 MG: 40 INJECTION SUBCUTANEOUS at 21:17

## 2020-07-21 RX ADMIN — Medication 5 MG: at 21:15

## 2020-07-21 RX ADMIN — ALBUTEROL SULFATE 2 PUFF: 90 AEROSOL, METERED RESPIRATORY (INHALATION) at 20:00

## 2020-07-21 RX ADMIN — VITAMIN D, TAB 1000IU (100/BT) 2 TABLET: 25 TAB at 09:21

## 2020-07-21 NOTE — PROGRESS NOTES
conducted an initial consultation and Spiritual Assessment for Cesar Lemus, who is a 52 y.o.,female. Patients Primary Language is: [place]. According to the patients EMR Quaker Affiliation is: No preference. The reason the Patient came to the hospital is:   Patient Active Problem List    Diagnosis Date Noted    Hypoxia 07/20/2020    Pneumonia due to COVID-19 virus 07/20/2020    Blood pressure elevated without history of HTN 01/15/2020    Morbid obesity with body mass index of 40.0-44.9 in adult [facility]) 01/15/2020        The  provided the following Interventions:  Initiated a relationship of care and support through phone conversation with patient. Explored issues of mihir, belief, spirituality and Mu-ism/ritual needs while hospitalized. Listened empathically. Provided information about Spiritual Care Services. Offered assurance of prayer on patient's behalf. Chart reviewed. Assessment:  Patient does not have any Mu-ism/cultural needs that will affect patients preferences in health care. Plan:  Chaplains will continue to follow and will provide pastoral care on an as needed/requested basis.  recommends bedside caregivers page  on duty if patient shows signs of acute spiritual or emotional distress. Rev.  [address] [facility]  [phone]

## 2020-07-21 NOTE — PROGRESS NOTES
Hospitalist Progress Note    Patient: Pablo Gagnon MRN: [id number]  CSN: [id number]    YOB: 1970  Age: 52 y.o. Sex: female    DOA: 7/20/2020 LOS:  LOS: 1 day          Chief Complaint:    SOB      Assessment/Plan     Pneumonia due to Covid 19 virus  Morbid obesity    Watch for hypoxia-she is not on 02 as of now  Continue decadron as doing  Nebs PRN, albuterol MDI  Gentle IVF  BID lovenox  DVT LE US pending  Echo completed    Supportive 02 if needed  Cocktail of vitamins , increased vit C dosing    Tele monitoring    I would expect to watch her symptoms another 24-48 hrs to ensure she does not decline  She feels better today so that is encouraging      Disposition :  Patient Active Problem List   Diagnosis Code    Hypoxia R09.02    Pneumonia due to COVID-19 virus U07.1, J12.89    Blood pressure elevated without history of HTN R03.0    Morbid obesity with body mass index of 40.0-44.9 in adult ([facility] E66.01, Z68.41       Subjective:    I feel better  Appetite poor  Weak  Denies pain  NP cough  Cough has settled down  No fevers  Multiple family members exposed and positive    Review of systems:    Constitutional: denies fevers, chills, myalgias  Cardiovascular: denies chest pain, palpitations  Gastrointestinal: denies nausea, vomiting, diarrhea      Vital signs/Intake and Output:  Visit Vitals  /78 (BP 1 Location: Left arm, BP Patient Position: At rest;Sitting)   Pulse 82   Temp 98.7 °F (37.1 °C)   Resp 17   Ht 5' 8\" (1.727 m)   Wt 127 kg (280 lb)   SpO2 98%   BMI 42.57 kg/m²     Current Shift:  No intake/output data recorded. Last three shifts:  07/19 1901 - 07/21 0700  In: 1240 [P.O.:240;  I.V.:1000]  Out: -     Exam:    General:obese WF alert, NAD, OX3  Head/Neck: NCAT, supple, No masses, No lymphadenopathy  Lungs:no accessory muscle use or retractions  Abdomen: Soft, Nontender, No distention, Normal Bowel sounds, No hepatomegaly  Extremities: No C/C/E, pulses palpable 2+  Neuro:grossly normal , follows commands  Psych:appropriate                Labs: Results:       Chemistry Recent Labs     07/21/20  0650 07/20/20  1340   * 129*    142   K 4.4 4.3    107   CO2 25 26   BUN 11 13   CREA 0.87 0.98   CA 9.1 9.6   AGAP 7 9   BUCR 13 13    116   TP 6.8 7.2   ALB 3.3* 3.4   GLOB 3.5 3.8   AGRAT 0.9 0.9      CBC w/Diff Recent Labs     07/21/20  0650 07/20/20  1340   WBC 7.0 6.7   RBC 4.76 5.12   HGB 14.9 15.8   HCT 45.1* 48.6*    416   GRANS 81* 67   LYMPH 18* 26   EOS 0 1      Cardiac Enzymes No results for input(s): CPK, CKND1, MELISSA in the last 72 hours. No lab exists for component: CKRMB, TROIP   Coagulation Recent Labs     07/21/20  0650   PTP 12.9   INR 1.0   APTT 28.7       Lipid Panel No results found for: CHOL, CHOLPOCT, CHOLX, CHLST, CHOLV, 185585, HDL, HDLP, LDL, LDLC, DLDLP, 318783, VLDLC, VLDL, TGLX, TRIGL, TRIGP, TGLPOCT, CHHD, CHHDX   BNP No results for input(s): BNPP in the last 72 hours.    Liver Enzymes Recent Labs     07/21/20  0650   TP 6.8   ALB 3.3*         Thyroid Studies No results found for: T4, T3U, TSH, TSHEXT     Procedures/imaging: see electronic medical records for all procedures/Xrays and details which were not copied into this note but were reviewed prior to creation of Kam Presley MD

## 2020-07-21 NOTE — CONSULTS
Brief Chart-Stalk note    Chart reviewed/pt NOT seen (yet - but will do tomorrow)  Agree with your plan  Normally dexamethasone not helpful in COVID-19 infections unless oxygen needed - but she has underlying asthma, so probably ok. I requested an ABO type, in case it comes to that, but will hold off on ordering any KRISHNAMURTHY plasma, as it doesn't look like she needs it. Will see her tomorrow afternoon after morning clinic (full).     Adriano West MD  Cell [phone]  [facility]

## 2020-07-21 NOTE — PROGRESS NOTES
Reason for Admission:   COVID pneumonia                   RUR Score:          7%           Plan for utilizing home health:      tbd    PCP: First and Last name:  Thania Juarez NP   Name of Practice:    Are you a current patient: Yes/No:    Approximate date of last visit:    Can you participate in a virtual visit with your PCP:                     Current Advanced Directive/Advance Care Plan:                          Transition of Care Plan:    Chart reviewed, pt on COVID unit. Pt self pay, CM will follow for needs at discharge. Pt would benefit from PT/OT when appropriate. Care Management Interventions  PCP Verified by CM:  Yes  Transition of Care Consult (CM Consult): Discharge Planning

## 2020-07-21 NOTE — PROGRESS NOTES
Problem: Falls - Risk of  Goal: *Absence of Falls  Description: Document Nicki [place] Fall Risk and appropriate interventions in the flowsheet.   Outcome: Progressing Towards Goal  Note: Fall Risk Interventions:            Medication Interventions: Teach patient to arise slowly

## 2020-07-21 NOTE — PROGRESS NOTES
0777-Report received care assumed at this time. 0920-levaquin bag hanging. Has not infused although was hung sometime last night. Will inform MD.    1055-MD Janay Stauffer says reorder to levaquin since she did not get the last dose. 1115-Pharmacists concerned if pt really needs levaquin, will page MD Yajaira Martinez who stopped order. 1122-MD Reyes says pt doesn't need the levaquin.

## 2020-07-21 NOTE — PROGRESS NOTES
2230 - Patient arrives to unit from ED at this time. Dual skin assessment completed with Kiko Bellamy Rn. Admission completed at this time. Patient is A/O x 4. IV to LFA intact and patent. NS 75 mL/hr. Pulses positive, palpable. Lungs diminished. Pt coughs when taking deep breath or talking. Pt reports nausea and diarrhea. Pain from headache rated 3/10. Patient was oriented to the room. Pt informed to utilize phone in room to dial desk for any questions, concerns, or needs. Pt verbalized understanding. Phone, call bell, and personal items left within reach. Will continue to monitor. Bedside and Verbal shift change report given to [facility] by Phil Malik RN. Report included the following information SBAR, Kardex, OR Summary, Intake/Output and MAR.

## 2020-07-21 NOTE — ED NOTES
Pt transported by [facility] EMT-p to room 348 per w/c pt on cardiac monitor.

## 2020-07-21 NOTE — ROUTINE PROCESS
TRANSFER - IN REPORT:    Verbal report received from [facility] ALMA SEO RN(name) on Justine Boles  being received from ED(unit) for routine progression of care      Report consisted of patients Situation, Background, Assessment and   Recommendations(SBAR). Information from the following report(s) SBAR, ED Summary and Recent Results was reviewed with the receiving nurse. Opportunity for questions and clarification was provided. Assessment completed upon patients arrival to unit and care assumed. Blood pressure 129/83, pulse 84, temperature 98.2 °F (36.8 °C), resp. rate 18, height 5' 8\" (1.727 m), weight 127 kg (280 lb), SpO2 95 %.

## 2020-07-22 LAB
ALBUMIN SERPL-MCNC: 3.7 G/DL (ref 3.4–5)
ALBUMIN/GLOB SERPL: 0.9 {RATIO} (ref 0.8–1.7)
ALP SERPL-CCNC: 113 U/L (ref 45–117)
ALT SERPL-CCNC: 67 U/L (ref 13–56)
ANION GAP SERPL CALC-SCNC: 5 MMOL/L (ref 3–18)
APTT PPP: 28.2 SEC (ref 23–36.4)
AST SERPL-CCNC: 36 U/L (ref 10–38)
BASOPHILS # BLD: 0 K/UL (ref 0–0.1)
BASOPHILS NFR BLD: 0 % (ref 0–2)
BILIRUB SERPL-MCNC: 0.4 MG/DL (ref 0.2–1)
BUN SERPL-MCNC: 16 MG/DL (ref 7–18)
BUN/CREAT SERPL: 15 (ref 12–20)
CALCIUM SERPL-MCNC: 9.5 MG/DL (ref 8.5–10.1)
CHLORIDE SERPL-SCNC: 107 MMOL/L (ref 100–111)
CO2 SERPL-SCNC: 29 MMOL/L (ref 21–32)
CREAT SERPL-MCNC: 1.07 MG/DL (ref 0.6–1.3)
D DIMER PPP FEU-MCNC: 1.82 UG/ML(FEU)
DIFFERENTIAL METHOD BLD: ABNORMAL
EOSINOPHIL # BLD: 0 K/UL (ref 0–0.4)
EOSINOPHIL NFR BLD: 0 % (ref 0–5)
ERYTHROCYTE [DISTWIDTH] IN BLOOD BY AUTOMATED COUNT: 12.6 % (ref 11.6–14.5)
FIBRINOGEN PPP-MCNC: 479 MG/DL (ref 210–451)
GLOBULIN SER CALC-MCNC: 4 G/DL (ref 2–4)
GLUCOSE SERPL-MCNC: 98 MG/DL (ref 74–99)
HCT VFR BLD AUTO: 48.3 % (ref 35–45)
HGB BLD-MCNC: 15.7 G/DL (ref 12–16)
INR PPP: 1.1 (ref 0.8–1.2)
LYMPHOCYTES # BLD: 2.3 K/UL (ref 0.9–3.6)
LYMPHOCYTES NFR BLD: 19 % (ref 21–52)
MCH RBC QN AUTO: 31.2 PG (ref 24–34)
MCHC RBC AUTO-ENTMCNC: 32.5 G/DL (ref 31–37)
MCV RBC AUTO: 96 FL (ref 74–97)
MONOCYTES # BLD: 0.8 K/UL (ref 0.05–1.2)
MONOCYTES NFR BLD: 6 % (ref 3–10)
NEUTS SEG # BLD: 9 K/UL (ref 1.8–8)
NEUTS SEG NFR BLD: 75 % (ref 40–73)
PLATELET # BLD AUTO: 493 K/UL (ref 135–420)
PMV BLD AUTO: 9.7 FL (ref 9.2–11.8)
POTASSIUM SERPL-SCNC: 3.9 MMOL/L (ref 3.5–5.5)
PROT SERPL-MCNC: 7.7 G/DL (ref 6.4–8.2)
PROTHROMBIN TIME: 13.6 SEC (ref 11.5–15.2)
RBC # BLD AUTO: 5.03 M/UL (ref 4.2–5.3)
SODIUM SERPL-SCNC: 141 MMOL/L (ref 136–145)
WBC # BLD AUTO: 12.1 K/UL (ref 4.6–13.2)

## 2020-07-22 PROCEDURE — 74011250636 HC RX REV CODE- 250/636: Performed by: INTERNAL MEDICINE

## 2020-07-22 PROCEDURE — 74011250636 HC RX REV CODE- 250/636: Performed by: HOSPITALIST

## 2020-07-22 PROCEDURE — 80053 COMPREHEN METABOLIC PANEL: CPT

## 2020-07-22 PROCEDURE — 74011250637 HC RX REV CODE- 250/637: Performed by: HOSPITALIST

## 2020-07-22 PROCEDURE — 74011250637 HC RX REV CODE- 250/637: Performed by: INTERNAL MEDICINE

## 2020-07-22 PROCEDURE — 85025 COMPLETE CBC W/AUTO DIFF WBC: CPT

## 2020-07-22 PROCEDURE — 85384 FIBRINOGEN ACTIVITY: CPT

## 2020-07-22 PROCEDURE — 85730 THROMBOPLASTIN TIME PARTIAL: CPT

## 2020-07-22 PROCEDURE — 65660000000 HC RM CCU STEPDOWN

## 2020-07-22 PROCEDURE — 85379 FIBRIN DEGRADATION QUANT: CPT

## 2020-07-22 PROCEDURE — 85610 PROTHROMBIN TIME: CPT

## 2020-07-22 RX ORDER — ALBUTEROL SULFATE 90 UG/1
2 AEROSOL, METERED RESPIRATORY (INHALATION) EVERY 6 HOURS
COMMUNITY

## 2020-07-22 RX ADMIN — ALBUTEROL SULFATE 2 PUFF: 90 AEROSOL, METERED RESPIRATORY (INHALATION) at 08:00

## 2020-07-22 RX ADMIN — ZINC SULFATE 220 MG (50 MG) CAPSULE 1 CAPSULE: CAPSULE at 08:42

## 2020-07-22 RX ADMIN — ENOXAPARIN SODIUM 40 MG: 40 INJECTION SUBCUTANEOUS at 21:48

## 2020-07-22 RX ADMIN — DEXAMETHASONE 6 MG: 4 TABLET ORAL at 08:42

## 2020-07-22 RX ADMIN — Medication 500 MG: at 21:48

## 2020-07-22 RX ADMIN — VITAMIN D, TAB 1000IU (100/BT) 2 TABLET: 25 TAB at 08:41

## 2020-07-22 RX ADMIN — ALBUTEROL SULFATE 2 PUFF: 90 AEROSOL, METERED RESPIRATORY (INHALATION) at 04:00

## 2020-07-22 RX ADMIN — ENOXAPARIN SODIUM 40 MG: 40 INJECTION SUBCUTANEOUS at 08:42

## 2020-07-22 RX ADMIN — Medication 5 MG: at 21:48

## 2020-07-22 RX ADMIN — ALBUTEROL SULFATE 2 PUFF: 90 AEROSOL, METERED RESPIRATORY (INHALATION) at 16:00

## 2020-07-22 RX ADMIN — Medication 500 MG: at 08:44

## 2020-07-22 NOTE — PROGRESS NOTES
0830 - Patient in bed at this time. A/O x 4. IV to left AC has small amount of old bloody drainage under dressingbut is  intact and patent. Lovenox is DVT prophylaxis. Patient denies numbness/tingling. Pedal and radial pulses palpable. Lungs diminished. . Bowel sounds active to all quadrants. Patient able to get to 1500 on the incentive spirometer. Pain 0/10. Patient states she continues to be unable to bring sputum up into mouth to produce sample, will continue to attempt. 0840-Patient ambulated to bathroom to urinate. 1400-reviewed PTA meds with patient to complete required docs. Patient denies any needs at this time.

## 2020-07-22 NOTE — PROGRESS NOTES
Problem: Falls - Risk of  Goal: *Absence of Falls  Description: Document Sean Le Fall Risk and appropriate interventions in the flowsheet.   Outcome: Progressing Towards Goal  Note: Fall Risk Interventions:            Medication Interventions: Teach patient to arise slowly                   Problem: Hypertension  Goal: *Blood pressure within specified parameters  Outcome: Progressing Towards Goal     Problem: Hypertension  Goal: *Fluid volume balance  Outcome: Progressing Towards Goal     Problem: Hypertension  Goal: *Labs within defined limits  Outcome: Progressing Towards Goal     Problem: Pain  Goal: *Control of Pain  Outcome: Progressing Towards Goal     Problem: Nausea/Vomiting (Adult)  Goal: *Absence of nausea/vomiting  Outcome: Progressing Towards Goal

## 2020-07-22 NOTE — PROGRESS NOTES
Hospitalist Progress Note    Patient: Alba Kong MRN: [id number]  CSN: [id number]    YOB: 1970  Age: 52 y.o. Sex: female    DOA: 7/20/2020 LOS:  LOS: 2 days          Chief Complaint:    SOB      Assessment/Plan     Pneumonia due to Covid 19 virus  Morbid obesity    Monitor for hypoxia, patient continues to do well on room air. No shortness of breath or dyspnea on exertion but does report continuing persistent cough but says that it has improved    Will DC Decadron as it has been shown in the medical literature that dexamethasone shows no benefits or non-oxygenated patients and shows only mortality benefit and vented patients which this patient is not    Agree that patient should continue to prone herself when lying down    Nebs PRN, albuterol MDI  Gentle IVF  BID lovenox  No DVT  Echo completed    Supportive 02 if needed  Cocktail of vitamins , increased vit C dosing    Tele monitoring    Disposition :  Patient Active Problem List   Diagnosis Code    Hypoxia R09.02    Pneumonia due to COVID-19 virus U07.1, J12.89    Blood pressure elevated without history of HTN R03.0    Morbid obesity with body mass index of 40.0-44.9 in adult ([facility] E66.01, Z68.41       Subjective:    Has no complaints this morning. Continuing to feel well from a respiratory standpoint. Persistent cough but has improved since admission      Review of systems:    Constitutional: denies fevers, chills, myalgias  Cardiovascular: denies chest pain, palpitations  Gastrointestinal: denies nausea, vomiting, diarrhea      Vital signs/Intake and Output:  Visit Vitals  /58   Pulse 70   Temp 98.5 °F (36.9 °C)   Resp 16   Ht 5' 8\" (1.727 m)   Wt 127 kg (280 lb)   SpO2 98%   BMI 42.57 kg/m²     Current Shift:  No intake/output data recorded.   Last three shifts:  07/20 1901 - 07/22 0700  In: 1300 [P.O.:1300]  Out: -     Exam:    General:obese WF alert, NAD, OX3  Head/Neck: NCAT, supple, No masses, No lymphadenopathy  Lungs:no accessory muscle use or retractions  Abdomen: Soft, Nontender, No distention, Normal Bowel sounds, No hepatomegaly  Extremities: No C/C/E, pulses palpable 2+  Neuro:grossly normal , follows commands  Psych:appropriate                Labs: Results:       Chemistry Recent Labs     07/22/20 0424 07/21/20  0650 07/20/20  1340   GLU 98 149* 129*    139 142   K 3.9 4.4 4.3    107 107   CO2 29 25 26   BUN 16 11 13   CREA 1.07 0.87 0.98   CA 9.5 9.1 9.6   AGAP 5 7 9   BUCR 15 13 13    101 116   TP 7.7 6.8 7.2   ALB 3.7 3.3* 3.4   GLOB 4.0 3.5 3.8   AGRAT 0.9 0.9 0.9      CBC w/Diff Recent Labs     07/22/20 0424 07/21/20  0650 07/20/20  1340   WBC 12.1 7.0 6.7   RBC 5.03 4.76 5.12   HGB 15.7 14.9 15.8   HCT 48.3* 45.1* 48.6*   * 414 416   GRANS 75* 81* 67   LYMPH 19* 18* 26   EOS 0 0 1      Cardiac Enzymes No results for input(s): CPK, CKND1, MELISSA in the last 72 hours. No lab exists for component: CKRMB, TROIP   Coagulation Recent Labs     07/22/20 0424 07/21/20  0650   PTP 13.6 12.9   INR 1.1 1.0   APTT 28.2 28.7       Lipid Panel No results found for: CHOL, CHOLPOCT, CHOLX, CHLST, CHOLV, 853400, HDL, HDLP, LDL, LDLC, DLDLP, 885246, VLDLC, VLDL, TGLX, TRIGL, TRIGP, TGLPOCT, CHHD, CHHDX   BNP No results for input(s): BNPP in the last 72 hours.    Liver Enzymes Recent Labs     07/22/20 0424   TP 7.7   ALB 3.7         Thyroid Studies No results found for: T4, T3U, TSH, TSHEXT, TSHEXT     Procedures/imaging: see electronic medical records for all procedures/Xrays and details which were not copied into this note but were reviewed prior to creation of Toney Crigler, MD

## 2020-07-22 NOTE — ROUTINE PROCESS
Bedside and Verbal shift change report given to LUYC Glass RN (oncoming nurse) by Tremayne Jain RN (offgoing nurse). Report included the following information SBAR, Kardex, Intake/Output and MAR.

## 2020-07-22 NOTE — PROGRESS NOTES
1900: Bedside shift change report given to GENEVA Espinoza RN (oncoming nurse) by Arabella Mcguire. Tone Blevins RN (offgoing nurse). Report included the following information SBAR, Kardex, Procedure Summary, Intake/Output, MAR, Accordion and Recent Results. Assumed care of patient. 0700: Bedside shift change report given to ISAAC Jain RN (oncoming nurse) by GENEVA Espinoza RN (offgoing nurse). Report included the following information SBAR, Kardex, Procedure Summary, Intake/Output, MAR, Accordion and Recent Results. Shift Summary: No complaints of chest pain or shortness of breath stated by patient.     Visit Vitals  /80   Pulse 78   Temp 97.8 °F (36.6 °C)   Resp 18   Ht 5' 8\" (1.727 m)   Wt 127 kg (280 lb)   SpO2 95%   BMI 42.57 kg/m²       Meenu Washington

## 2020-07-22 NOTE — CONSULTS
[facility]  (A Division of [address] [place] [address])      Consultation Note      Date of Admission: 7/20/2020    Date of Consultation: 7/22/2020    Referred by: Isrrael Garcia MD    Reason for Referral: COVID-19      Current Antimicrobials:    Prior Antimicrobials:  none 1. Levo IV (7/20)       Assessment: Rec / Plan:   COVID-19    Has been ill with this for at least 2wks after exposure to family members (former patient of mine here) played cards over the 4th weekend. Went to outpatient clinic in [facility] and had (+) test 7/14 confirming her disease. But with all that said, she is not really requiring any oxygen supplementation - and feels better today. Studies not showing dexamethasone helping any in those without need for oxygenation such has her. I'd hold it. Her ABO is O+ should she return and require KRISHNAMURTHY plasma, but would not give it at this point. ->Dexa #2    I'm good with her going home today or tomorrow, if she continues to look/feel this good. No more steroid. Self-quarantine at home for 2wks from today. I'll be here tomorrow and look at her in the morning. Morbid obesity - BMI 42    Asthma    HTN              Microbiology:  7/20 - Urine Spneumo Ag and Legionella Ag both (-)       7/14 - COVID-19 (+) in [facility] / Catheters: peipheral      HPI:  CC:  I'm really feeling much better. Not out of breath walking around room  Ms Jerica Mccabe is a 53y sister-in-law of a recent hospitalized COVID-19 patient of mine who was admitted 7/20 for cough/+test.  Her brother-in-law was playing cards with a nephew of theirs back on 7.4.2020 who was infected at the time. Several others within her house have all subsequently come down with COVID-19. Her symptoms began a couple of weeks ago shortly after the card-game. Had fever, malaise, Dry cough with dyspnea/BIRD, and some diarrhea. Lost taste, but this has been improving over the weekend. Went to a local [facility] 7/14 for those symptoms and COVID-19 test returned (+). Told to come here and was subsequently admitted 7/20, but has noted improvement in cough and dyspnea such that she is no longer dyspneic walking around room or working on incentive spirometer. Taste improving. Diarrhea resolved. Active Hospital Problems    Diagnosis Date Noted    Hypoxia 07/20/2020    Pneumonia due to COVID-19 virus 07/20/2020    Blood pressure elevated without history of HTN 01/15/2020    Morbid obesity with body mass index of 40.0-44.9 in adult [facility]) 01/15/2020     Past Medical History:   Diagnosis Date    HTN (hypertension)     Hyperglycemia     Obesity      History reviewed. No pertinent surgical history.   Family History   Problem Relation Age of Onset    Heart Disease Mother      Social History     Socioeconomic History    Marital status:      Spouse name: Not on file    Number of children: Not on file    Years of education: Not on file    Highest education level: Not on file   Occupational History    Not on file   Social Needs    Financial resource strain: Not on file    Food insecurity     Worry: Not on file     Inability: Not on file    Transportation needs     Medical: Not on file     Non-medical: Not on file   Tobacco Use    Smoking status: Never Smoker    Smokeless tobacco: Never Used   Substance and Sexual Activity    Alcohol use: Not Currently    Drug use: Never    Sexual activity: Not on file   Lifestyle    Physical activity     Days per week: Not on file     Minutes per session: Not on file    Stress: Not on file   Relationships    Social connections     Talks on phone: Not on file     Gets together: Not on file     Attends Rastafarian service: Not on file     Active member of club or organization: Not on file     Attends meetings of clubs or organizations: Not on file     Relationship status: Not on file    Intimate partner violence     Fear of current or ex partner: Not on file     Emotionally abused: Not on file     Physically abused: Not on file     Forced sexual activity: Not on file   Other Topics Concern    Not on file   Social History Narrative    Not on file       Allergies:  Penicillins     Medications:  Current Facility-Administered Medications   Medication Dose Route Frequency    ascorbic acid (vitamin C) (VITAMIN C) tablet 500 mg  500 mg Oral BID    enoxaparin (LOVENOX) injection 40 mg  40 mg SubCUTAneous Q12H    acetaminophen (TYLENOL) tablet 650 mg  650 mg Oral Q6H PRN    Or    acetaminophen (TYLENOL) suppository 650 mg  650 mg Rectal Q6H PRN    zinc sulfate (ZINCATE) 220 (50) mg capsule 1 Cap  1 Cap Oral DAILY    melatonin tablet 5 mg  5 mg Oral QHS    cholecalciferol (VITAMIN D3) (1000 Units /25 mcg) tablet 2 Tab  2,000 Units Oral DAILY    chlorpheniramine-HYDROcodone (TUSSIONEX) oral suspension 5 mL  5 mL Oral Q12H PRN    albuterol (PROVENTIL HFA, VENTOLIN HFA, PROAIR HFA) inhaler (Keep In Patient Room)  2 Puff Inhalation Q4H RT        ROS:  Constitutional: positive for fatigue and malaise, negative for fevers, chills and sweats  Ears, Nose, Mouth, Throat, and Face: positive for taste sensation/loss  Respiratory: positive for cough, negative for sputum, hemoptysis or dyspnea on exertion  Cardiovascular: negative for chest pain, dyspnea  Gastrointestinal: positive for nausea and diarrhea, negative for vomiting and abdominal pain  Genitourinary:negative for dysuria     Physical Exam:    HPI:  Temp (24hrs), Av.2 °F (36.8 °C), Min:97.6 °F (36.4 °C), Max:99.1 °F (37.3 °C)    Visit Vitals  /87   Pulse 82   Temp 99.1 °F (37.3 °C)   Resp 16   Ht 5' 8\" (1.727 m)   Wt 127 kg (280 lb)   SpO2 95%   BMI 42.57 kg/m²       General: Well developed, well nourished 52 y.o. WHITE OR  female in no acute distress.   ENT: ENT exam normal, no neck nodes or sinus tenderness  Head: normocephalic, without obvious abnormality  Mouth:  mucous membranes moist, pharynx normal without lesions  Neck: supple, symmetrical, trachea midline   Cardio:  regular rate and rhythm, S1, S2 normal, no murmur, click, rub or gallop  Chest: inspection normal - no chest wall deformities or tenderness, respiratory effort normal  Lungs: clear to auscultation, no wheezes or rales and unlabored breathing  Abdomen: soft, non-tender. Bowel sounds normal. No masses, no organomegaly.   Extremities:  no redness or tenderness in the calves or thighs, no edema  Neuro: Grossly normal       Lab results:    Chemistry  Recent Labs     07/22/20 0424 07/21/20  0650 07/20/20  1340   GLU 98 149* 129*    139 142   K 3.9 4.4 4.3    107 107   CO2 29 25 26   BUN 16 11 13   CREA 1.07 0.87 0.98   CA 9.5 9.1 9.6   AGAP 5 7 9   BUCR 15 13 13    101 116   TP 7.7 6.8 7.2   ALB 3.7 3.3* 3.4   GLOB 4.0 3.5 3.8   AGRAT 0.9 0.9 0.9       CBC w/ Diff  Recent Labs     07/22/20 0424 07/21/20  0650 07/20/20  1340   WBC 12.1 7.0 6.7   RBC 5.03 4.76 5.12   HGB 15.7 14.9 15.8   HCT 48.3* 45.1* 48.6*   * 414 416   GRANS 75* 81* 67   LYMPH 19* 18* 26   EOS 0 0 1       Microbiology  All Micro Results     Procedure Component Value Units Date/Time    JENNIFER Hernández [[id number]] Collected:  07/20/20 1420    Order Status:  Completed Specimen:  Urine, random Updated:  07/21/20 1209     Strep pneumo Ag, urine Negative       LEGIONELLA PNEUMOPHILA AG, URINE [[id number]] Collected:  07/20/20 1420    Order Status:  Completed Specimen:  Urine, random Updated:  07/21/20 1208     Legionella Ag, urine Negative       CULTURE, BLOOD [[id number]]     Order Status:  Sent Specimen:  Blood     CULTURE, BLOOD [[id number]]     Order Status:  Sent Specimen:  Blood     CULTURE, RESPIRATORY/SPUTUM/BRONCH Thereasa Gravely STAIN [[id number]]     Order Status:  Sent Specimen:  Sputum from Nasopharynx     RESPIRATORY PANEL,PCR,NASOPHARYNGEAL [[id number]]     Order Status:  Sent Specimen:  NASOPHARYNGEAL SWAB            Perez Jeronimo, MD  Cell [phone]  [facility]  7/22/2020   5:32 PM

## 2020-07-22 NOTE — ROUTINE PROCESS
4187 - Bedside report received from Anurag Segovia RN. Patient in bed at this time. Pain 0/10. Plan of care for the day addressed with the patient.

## 2020-07-22 NOTE — ROUTINE PROCESS
1920 - Bedside and Verbal shift change report given to Gay Ga by Slade Giraldo RN. Report included the following information SBAR, Kardex, OR Summary, Intake/Output and MAR.

## 2020-07-23 VITALS
WEIGHT: 280 LBS | OXYGEN SATURATION: 97 % | HEART RATE: 66 BPM | DIASTOLIC BLOOD PRESSURE: 86 MMHG | SYSTOLIC BLOOD PRESSURE: 133 MMHG | RESPIRATION RATE: 16 BRPM | TEMPERATURE: 98.4 F | HEIGHT: 68 IN | BODY MASS INDEX: 42.44 KG/M2

## 2020-07-23 LAB
ALBUMIN SERPL-MCNC: 3.6 G/DL (ref 3.4–5)
ALBUMIN/GLOB SERPL: 1.1 {RATIO} (ref 0.8–1.7)
ALP SERPL-CCNC: 90 U/L (ref 45–117)
ALT SERPL-CCNC: 74 U/L (ref 13–56)
ANION GAP SERPL CALC-SCNC: 6 MMOL/L (ref 3–18)
APTT PPP: 28 SEC (ref 23–36.4)
AST SERPL-CCNC: 40 U/L (ref 10–38)
BASOPHILS # BLD: 0 K/UL (ref 0–0.1)
BASOPHILS NFR BLD: 0 % (ref 0–2)
BILIRUB SERPL-MCNC: 0.4 MG/DL (ref 0.2–1)
BUN SERPL-MCNC: 12 MG/DL (ref 7–18)
BUN/CREAT SERPL: 14 (ref 12–20)
CALCIUM SERPL-MCNC: 9.4 MG/DL (ref 8.5–10.1)
CHLORIDE SERPL-SCNC: 109 MMOL/L (ref 100–111)
CO2 SERPL-SCNC: 26 MMOL/L (ref 21–32)
CREAT SERPL-MCNC: 0.85 MG/DL (ref 0.6–1.3)
D DIMER PPP FEU-MCNC: 2.67 UG/ML(FEU)
DIFFERENTIAL METHOD BLD: ABNORMAL
EOSINOPHIL # BLD: 0 K/UL (ref 0–0.4)
EOSINOPHIL NFR BLD: 0 % (ref 0–5)
ERYTHROCYTE [DISTWIDTH] IN BLOOD BY AUTOMATED COUNT: 12.6 % (ref 11.6–14.5)
FIBRINOGEN PPP-MCNC: 407 MG/DL (ref 210–451)
GLOBULIN SER CALC-MCNC: 3.4 G/DL (ref 2–4)
GLUCOSE SERPL-MCNC: 90 MG/DL (ref 74–99)
HCT VFR BLD AUTO: 45 % (ref 35–45)
HGB BLD-MCNC: 14.9 G/DL (ref 12–16)
INR PPP: 1 (ref 0.8–1.2)
LYMPHOCYTES # BLD: 2.8 K/UL (ref 0.9–3.6)
LYMPHOCYTES NFR BLD: 30 % (ref 21–52)
MCH RBC QN AUTO: 31.4 PG (ref 24–34)
MCHC RBC AUTO-ENTMCNC: 33.1 G/DL (ref 31–37)
MCV RBC AUTO: 94.9 FL (ref 74–97)
MONOCYTES # BLD: 0.6 K/UL (ref 0.05–1.2)
MONOCYTES NFR BLD: 6 % (ref 3–10)
NEUTS SEG # BLD: 6.1 K/UL (ref 1.8–8)
NEUTS SEG NFR BLD: 64 % (ref 40–73)
PLATELET # BLD AUTO: 432 K/UL (ref 135–420)
PMV BLD AUTO: 9.5 FL (ref 9.2–11.8)
POTASSIUM SERPL-SCNC: 4 MMOL/L (ref 3.5–5.5)
PROT SERPL-MCNC: 7 G/DL (ref 6.4–8.2)
PROTHROMBIN TIME: 13.2 SEC (ref 11.5–15.2)
RBC # BLD AUTO: 4.74 M/UL (ref 4.2–5.3)
SODIUM SERPL-SCNC: 141 MMOL/L (ref 136–145)
WBC # BLD AUTO: 9.5 K/UL (ref 4.6–13.2)

## 2020-07-23 PROCEDURE — 85610 PROTHROMBIN TIME: CPT

## 2020-07-23 PROCEDURE — 85384 FIBRINOGEN ACTIVITY: CPT

## 2020-07-23 PROCEDURE — 74011250637 HC RX REV CODE- 250/637: Performed by: HOSPITALIST

## 2020-07-23 PROCEDURE — 85025 COMPLETE CBC W/AUTO DIFF WBC: CPT

## 2020-07-23 PROCEDURE — 74011250637 HC RX REV CODE- 250/637: Performed by: INTERNAL MEDICINE

## 2020-07-23 PROCEDURE — 36415 COLL VENOUS BLD VENIPUNCTURE: CPT

## 2020-07-23 PROCEDURE — 85379 FIBRIN DEGRADATION QUANT: CPT

## 2020-07-23 PROCEDURE — 80053 COMPREHEN METABOLIC PANEL: CPT

## 2020-07-23 PROCEDURE — 74011250636 HC RX REV CODE- 250/636: Performed by: HOSPITALIST

## 2020-07-23 PROCEDURE — 85730 THROMBOPLASTIN TIME PARTIAL: CPT

## 2020-07-23 RX ORDER — ASCORBIC ACID 500 MG
500 TABLET ORAL 2 TIMES DAILY
Qty: 6 TAB | Refills: 0 | Status: SHIPPED | OUTPATIENT
Start: 2020-07-23 | End: 2020-07-26

## 2020-07-23 RX ORDER — ZINC SULFATE 50(220)MG
220 CAPSULE ORAL 2 TIMES DAILY
Qty: 6 CAP | Refills: 0 | Status: SHIPPED | OUTPATIENT
Start: 2020-07-23

## 2020-07-23 RX ORDER — MELATONIN
2000 DAILY
Qty: 6 TAB | Refills: 0 | Status: SHIPPED | OUTPATIENT
Start: 2020-07-23

## 2020-07-23 RX ADMIN — ZINC SULFATE 220 MG (50 MG) CAPSULE 1 CAPSULE: CAPSULE at 08:34

## 2020-07-23 RX ADMIN — Medication 500 MG: at 08:34

## 2020-07-23 RX ADMIN — VITAMIN D, TAB 1000IU (100/BT) 2 TABLET: 25 TAB at 07:56

## 2020-07-23 RX ADMIN — ENOXAPARIN SODIUM 40 MG: 40 INJECTION SUBCUTANEOUS at 08:34

## 2020-07-23 RX ADMIN — ALBUTEROL SULFATE 2 PUFF: 90 AEROSOL, METERED RESPIRATORY (INHALATION) at 08:00

## 2020-07-23 NOTE — ROUTINE PROCESS
Assumed care of pt from [facility] 74.  0800: Pt up on side of bed no c/o pain or SOB.

## 2020-07-23 NOTE — PROGRESS NOTES
2147-alert and oriented x 4. Lungs CTA, diminished. On RA. Unable to give sputum specimen at this time. Patient is positive for Covid. On box 17 showing NSR. Denies pain. Skin intact. 4356- attempted lab draw. Notified lab nurse unable to fill all tubes. Shift summary-up to bathroom independently, voiding sufficiently into hat. Denies pain. Non productive cough, unable to get sputum specimen. Continues showing NSR on box 17.

## 2020-07-23 NOTE — DISCHARGE INSTRUCTIONS
DISCHARGE SUMMARY from Nurse    PATIENT INSTRUCTIONS:    After general anesthesia or intravenous sedation, for 24 hours or while taking prescription Narcotics:  · Limit your activities  · Do not drive and operate hazardous machinery  · Do not make important personal or business decisions  · Do  not drink alcoholic beverages  · If you have not urinated within 8 hours after discharge, please contact your surgeon on call. Report the following to your surgeon:  · Excessive pain, swelling, redness or odor of or around the surgical area  · Temperature over 100.5  · Nausea and vomiting lasting longer than 4 hours or if unable to take medications  · Any signs of decreased circulation or nerve impairment to extremity: change in color, persistent  numbness, tingling, coldness or increase pain  · Any questions    What to do at Home:  Recommended activity: Activity as tolerated,         *  Please give a list of your current medications to your Primary Care Provider. *  Please update this list whenever your medications are discontinued, doses are      changed, or new medications (including over-the-counter products) are added. *  Please carry medication information at all times in case of emergency situations. These are general instructions for a healthy lifestyle:    No smoking/ No tobacco products/ Avoid exposure to second hand smoke  Surgeon General's Warning:  Quitting smoking now greatly reduces serious risk to your health. Obesity, smoking, and sedentary lifestyle greatly increases your risk for illness    A healthy diet, regular physical exercise & weight monitoring are important for maintaining a healthy lifestyle    You may be retaining fluid if you have a history of heart failure or if you experience any of the following symptoms:  Weight gain of 3 pounds or more overnight or 5 pounds in a week, increased swelling in our hands or feet or shortness of breath while lying flat in bed.   Please call your doctor as soon as you notice any of these symptoms; do not wait until your next office visit. The discharge information has been reviewed with the patient. The patient verbalized understanding. Discharge medications reviewed with the patient and appropriate educational materials and side effects teaching were provided.   ___________________________________________________________________________________________________________________________________  Patient armband removed and shredded

## 2020-07-23 NOTE — DISCHARGE SUMMARY
[facility] SUMMARY    Name:  Jose Cordero  MR#:   [id number]  :  1970  ACCOUNT #:  [de-identified]  ADMIT DATE:  2020  DISCHARGE DATE:  2020    CONSULTANT:  Gretchen Ricks MD, Infectious Disease. DISCHARGE DIAGNOSIS:  COVID pneumonia. HOSPITAL SUMMARY:  This is a 42-year-old  female with a history of hypertension and obesity. She came in with worsening hypoxemia, dyspnea, and cough. She was tested positive for COVID-19 viral infection on . She had been at a family gathering on the  where several family members subsequently tested positive after that gathering. She came into the hospital for further treatment, has not required any degree of oxygen support. Initially started on steroids, but new information has come out that this is not going to be helpful unless there is oxygen requirement. She is stable on an antioxidant vitamin cocktail. She has gotten better with cough medicine and supportive treatment. She is not hypoxic. She is awake, alert, oriented x3 this morning, in no acute distress, ready to go home. Denies fevers, chills, myalgias. Her appetite is starting to come back. Lungs are clear bilaterally. Cardiac exam regular rate and rhythm. Abdomen is obese, soft, nontender. Lower extremities are without edema. Her mentation is appropriate. She is stable to be released from the hospital today. Infectious Disease has cleared her also. She has been recommended to self quarantine for two weeks from today. Her vital signs are within normal limits. Plan to discharge her on vitamin C 500 mg twice daily for another three days, zinc 50 mg twice daily for another three days, vitamin D 2000 units daily for another three days. Otherwise, resume her medications that she was on at home to include her albuterol inhaler as needed.   We discussed quarantine, rest at home, return to the emergency room for symptoms of fevers, chills, worsening shortness of breath, hypoxia, nausea, vomiting, or diarrhea. She understands all that. She is improved and ready for discharge from the hospital today. Follow up with her primary care physician in 3-7 days.       Connie Bacon MD      [id number]  D:  07/23/2020 11:48  T:  07/23/2020 16:15  JOB #:  [id number]